# Patient Record
Sex: MALE | Race: WHITE | NOT HISPANIC OR LATINO | Employment: FULL TIME | RURAL
[De-identification: names, ages, dates, MRNs, and addresses within clinical notes are randomized per-mention and may not be internally consistent; named-entity substitution may affect disease eponyms.]

---

## 2024-09-11 ENCOUNTER — OFFICE VISIT (OUTPATIENT)
Dept: FAMILY MEDICINE | Facility: CLINIC | Age: 39
End: 2024-09-11
Payer: COMMERCIAL

## 2024-09-11 VITALS
DIASTOLIC BLOOD PRESSURE: 76 MMHG | SYSTOLIC BLOOD PRESSURE: 114 MMHG | BODY MASS INDEX: 29.77 KG/M2 | WEIGHT: 232 LBS | HEART RATE: 71 BPM | HEIGHT: 74 IN

## 2024-09-11 DIAGNOSIS — R19.7 DIARRHEA OF PRESUMED INFECTIOUS ORIGIN: ICD-10-CM

## 2024-09-11 DIAGNOSIS — I49.3 PVC'S (PREMATURE VENTRICULAR CONTRACTIONS): ICD-10-CM

## 2024-09-11 DIAGNOSIS — F17.200 TOBACCO DEPENDENCE: ICD-10-CM

## 2024-09-11 DIAGNOSIS — R00.2 PALPITATIONS: Primary | ICD-10-CM

## 2024-09-11 RX ORDER — DOXYCYCLINE HYCLATE 100 MG
100 TABLET ORAL DAILY
COMMUNITY
Start: 2024-08-12

## 2024-09-11 RX ORDER — FOLIC ACID 1 MG/1
1000 TABLET ORAL
COMMUNITY
Start: 2024-08-15

## 2024-09-11 NOTE — ASSESSMENT & PLAN NOTE
Patient has had experiences this which has led to his most recent evaluation with an EKG.  We will request records from this visit as well as labs.

## 2024-09-11 NOTE — PATIENT INSTRUCTIONS
José Mckinney,     If you are due for any health screening(s) below please notify me so we can arrange them to be ordered and scheduled to maintain your health. Most healthy patients complete it. Don't lose out on improving your health.     All of your core healthy metrics are met.

## 2024-09-11 NOTE — LETTER
AUTHORIZATION FOR RELEASE OF   CONFIDENTIAL INFORMATION    Dear Medical Records,    We are seeing Hank Sharif III, date of birth 1985, in the clinic at [facility]. Edmond Day IV, DO is the patient's PCP. Hank Sharif III has an outstanding lab/procedure at the time we reviewed his chart. In order to help keep his health information updated, he has authorized us to request the following medical record(s):        (  )  MAMMOGRAM                                      (  )  COLONOSCOPY      (  )  PAP SMEAR                                          (  )  OUTSIDE LAB RESULTS     (  )  DEXA SCAN                                          (  )  EYE EXAM            (  )  FOOT EXAM                                          (  )  ENTIRE RECORD     (  )  OUTSIDE IMMUNIZATIONS                 (X)  LAST OFFICE VISIT, LABS,EKG ETC...         Please fax records to Edmond Day IV, DO, [phone]     If you have any questions, please contact [place] at [phone].           Patient Name: Hank Sharif III  : 1985  Patient Phone #: [phone]

## 2024-09-11 NOTE — ASSESSMENT & PLAN NOTE
Patient is experiencing diarrhea for the last 3 weeks he has Juab stool type 6 stools that possibly began after shellfish ingestion.    Patient may be dehydrated with electrolyte abnormalities that could be causing these palpitations and PVCs.  Would evaluate with basic metabolic panel as well as ova and parasite and enteric pathogen panel.  We will request records and if these have not been done we will per care.

## 2024-09-11 NOTE — ASSESSMENT & PLAN NOTE
Patient states there was approximately 1 every minute to minute and have on an EKG had done outside facility.  We will request those records.

## 2024-09-11 NOTE — PROGRESS NOTES
"              Edmond Day IV, DO  [facility]  [address] [place] [address]  Phone: [phone]      Subjective     Name: Hank Sharif III   Sex: male  YOB: 1985 (39 y.o.)  MRN: [id number]  Visit Date: 09/11/2024   Chief Complaint: Palpitations        HISTORY OF PRESENT ILLNESS:    Chief Complaint   Patient presents with    Palpitations       HPI  See tests that keep you healthy and the problem oriented documentation below.    All of your core healthy metrics are met.      Portions of this note may have been created with voice recognition software. Occasional "wrong-word" or "sound-a-like" substitutions may have occurred due to the inherent limitations of voice recognition software. Please, read the note carefully and recognize, using context, where substitutions have occurred.     PAST MEDICAL HISTORY:  Significant Diagnoses - Patient  has no past medical history on file.  Medications - Patient has a current medication list which includes the following long-term medication(s): folic acid.   Allergies - Patient has No Known Allergies.  Surgeries - Patient  has no past surgical history on file.  Family History - Patient family history is not on file.      SOCIAL HISTORY:  Tobacco - Patient  reports that he has been smoking cigarettes. He started smoking about 22 years ago. He has a 45.4 pack-year smoking history. He has never used smokeless tobacco.   Alcohol - Patient  reports current alcohol use of about 3.0 standard drinks of alcohol per week.   Recreational Drugs - Patient  reports that he does not currently use drugs.       Review of Systems   All other systems reviewed and are negative.       No past medical history on file.     Review of patient's allergies indicates:  No Known Allergies     No past surgical history on file.     No family history on file.    Current Outpatient Medications   Medication Instructions    doxycycline (VIBRA-TABS) 100 mg, Oral, Daily " "   folic acid (FOLVITE) 1,000 mcg, Oral        Objective     /76   Pulse 71   Ht 6' 2" (1.88 m)   Wt 105.2 kg (232 lb)   BMI 29.79 kg/m²     Physical Exam  Constitutional:       General: He is not in acute distress.     Appearance: Normal appearance. He is not ill-appearing.   HENT:      Head: Normocephalic and atraumatic.      Right Ear: External ear normal.      Left Ear: External ear normal.      Nose: Nose normal.   Eyes:      Extraocular Movements: Extraocular movements intact.   Cardiovascular:      Rate and Rhythm: Normal rate.   Pulmonary:      Effort: Pulmonary effort is normal.   Abdominal:      Palpations: Abdomen is soft.   Musculoskeletal:      Cervical back: Normal range of motion. No tenderness.   Neurological:      Mental Status: He is alert.   Psychiatric:         Mood and Affect: Mood normal.         Behavior: Behavior normal.        All recently obtained labs have been reviewed and discussed in detail with the patient.   Assessment     1. Palpitations    2. PVC's (premature ventricular contractions)    3. Tobacco dependence    4. Diarrhea of presumed infectious origin         Plan        Problem List Items Addressed This Visit       Tobacco dependence (Chronic)    Relevant Orders    Basic Metabolic Panel    PVC's (premature ventricular contractions)     Patient states there was approximately 1 every minute to minute and have on an EKG had done outside facility.  We will request those records.         Relevant Orders    Basic Metabolic Panel    Palpitations - Primary (Chronic)     Patient has had experiences this which has led to his most recent evaluation with an EKG.  We will request records from this visit as well as labs.         Relevant Orders    Basic Metabolic Panel    Diarrhea of presumed infectious origin     Patient is experiencing diarrhea for the last 3 weeks he has Delaplane stool type 6 stools that possibly began after shellfish ingestion.    Patient may be dehydrated with " electrolyte abnormalities that could be causing these palpitations and PVCs.  Would evaluate with basic metabolic panel as well as ova and parasite and enteric pathogen panel.  We will request records and if these have not been done we will per care.         Relevant Orders    Enteric Pathogen Panel    Ova and Parasite Exam       No follow-ups on file.    Patient advised that is symptoms worsen, they should call or report directly to local emergency department.    Edmond Day, DO  [facility]

## 2024-10-22 ENCOUNTER — OFFICE VISIT (OUTPATIENT)
Dept: FAMILY MEDICINE | Facility: CLINIC | Age: 39
End: 2024-10-22
Payer: COMMERCIAL

## 2024-10-22 VITALS
HEART RATE: 81 BPM | HEIGHT: 74 IN | BODY MASS INDEX: 29 KG/M2 | SYSTOLIC BLOOD PRESSURE: 125 MMHG | DIASTOLIC BLOOD PRESSURE: 86 MMHG | WEIGHT: 226 LBS

## 2024-10-22 DIAGNOSIS — R07.9 CHEST PAIN, UNSPECIFIED TYPE: ICD-10-CM

## 2024-10-22 DIAGNOSIS — K02.9 DENTAL CARIES: ICD-10-CM

## 2024-10-22 DIAGNOSIS — Z82.49 FAMILY HISTORY OF EARLY CAD: Primary | ICD-10-CM

## 2024-10-22 PROCEDURE — 3008F BODY MASS INDEX DOCD: CPT | Mod: CPTII,,, | Performed by: FAMILY MEDICINE

## 2024-10-22 PROCEDURE — 1159F MED LIST DOCD IN RCRD: CPT | Mod: CPTII,,, | Performed by: FAMILY MEDICINE

## 2024-10-22 PROCEDURE — 3079F DIAST BP 80-89 MM HG: CPT | Mod: CPTII,,, | Performed by: FAMILY MEDICINE

## 2024-10-22 PROCEDURE — 99214 OFFICE O/P EST MOD 30 MIN: CPT | Mod: ,,, | Performed by: FAMILY MEDICINE

## 2024-10-22 PROCEDURE — 3074F SYST BP LT 130 MM HG: CPT | Mod: CPTII,,, | Performed by: FAMILY MEDICINE

## 2024-10-22 RX ORDER — PENICILLIN V POTASSIUM 500 MG/1
500 TABLET, FILM COATED ORAL EVERY 8 HOURS
Qty: 21 TABLET | Refills: 0 | Status: SHIPPED | OUTPATIENT
Start: 2024-10-22 | End: 2024-10-29

## 2024-10-23 NOTE — PROGRESS NOTES
"              Edmond Day IV, DO  [facility]  [address] [place] [address]  Phone: [phone]      Subjective     Name: Hank Sharif III   Sex: male  YOB: 1985 (39 y.o.)  MRN: [id number]  Visit Date: 10/23/2024   Chief Complaint: Chest Pain        HISTORY OF PRESENT ILLNESS:    Chief Complaint   Patient presents with    Chest Pain       HPI  See tests that keep you healthy and the problem oriented documentation below.    All of your core healthy metrics are met.      Portions of this note may have been created with voice recognition software. Occasional "wrong-word" or "sound-a-like" substitutions may have occurred due to the inherent limitations of voice recognition software. Please, read the note carefully and recognize, using context, where substitutions have occurred.     PAST MEDICAL HISTORY:  Significant Diagnoses - Patient  has no past medical history on file.  Medications - Patient is not on any long-term medications.   Allergies - Patient has No Known Allergies.  Surgeries - Patient  has no past surgical history on file.  Family History - Patient family history is not on file.      SOCIAL HISTORY:  Tobacco - Patient  reports that he has been smoking cigarettes. He started smoking about 22 years ago. He has a 45.6 pack-year smoking history. He has never used smokeless tobacco.   Alcohol - Patient  reports current alcohol use of about 3.0 standard drinks of alcohol per week.   Recreational Drugs - Patient  reports that he does not currently use drugs.       Review of Systems   Constitutional:  Negative for activity change and unexpected weight change.   HENT:  Negative for hearing loss, rhinorrhea and trouble swallowing.    Eyes:  Negative for discharge and visual disturbance.   Respiratory:  Positive for chest tightness. Negative for wheezing.    Cardiovascular:  Positive for chest pain. Negative for palpitations.   Gastrointestinal:  Negative for blood in " "stool, constipation, diarrhea and vomiting.   Endocrine: Negative for polydipsia and polyuria.   Genitourinary:  Negative for difficulty urinating, hematuria and urgency.   Musculoskeletal:  Negative for arthralgias, joint swelling and neck pain.   Neurological:  Negative for weakness and headaches.   Psychiatric/Behavioral:  Negative for confusion and dysphoric mood.    All other systems reviewed and are negative.       History reviewed. No pertinent past medical history.     Review of patient's allergies indicates:  No Known Allergies     History reviewed. No pertinent surgical history.     History reviewed. No pertinent family history.    Current Outpatient Medications   Medication Instructions    penicillin v potassium (VEETID) 500 mg, Oral, Every 8 hours        Objective     /86   Pulse 81   Ht 6' 2" (1.88 m)   Wt 102.5 kg (226 lb)   BMI 29.02 kg/m²     Physical Exam  Constitutional:       General: He is not in acute distress.     Appearance: Normal appearance. He is not ill-appearing.   HENT:      Head: Normocephalic and atraumatic.      Right Ear: External ear normal.      Left Ear: External ear normal.      Nose: Nose normal.   Eyes:      Extraocular Movements: Extraocular movements intact.   Cardiovascular:      Rate and Rhythm: Normal rate.   Pulmonary:      Effort: Pulmonary effort is normal.   Abdominal:      Palpations: Abdomen is soft.   Musculoskeletal:      Cervical back: Normal range of motion. No tenderness.   Neurological:      Mental Status: He is alert.   Psychiatric:         Mood and Affect: Mood normal.         Behavior: Behavior normal.        All recently obtained labs have been reviewed and discussed in detail with the patient.   Assessment     1. Family history of early CAD    2. Chest pain, unspecified type    3. Dental caries         Plan        Problem List Items Addressed This Visit    None  Visit Diagnoses       Family history of early CAD    -  Primary    Relevant Orders "    Lipid Panel    CBC Auto Differential    Comprehensive Metabolic Panel    Ambulatory referral/consult to Cardiology    Chest pain, unspecified type        Relevant Orders    Lipid Panel    CBC Auto Differential    Comprehensive Metabolic Panel    Ambulatory referral/consult to Cardiology    Dental caries        Relevant Medications    penicillin v potassium (VEETID) 500 MG tablet            No follow-ups on file.    Patient advised that is symptoms worsen, they should call or report directly to local emergency department.    Edmond Day, DO  [facility]

## 2024-11-08 ENCOUNTER — PATIENT MESSAGE (OUTPATIENT)
Dept: FAMILY MEDICINE | Facility: CLINIC | Age: 39
End: 2024-11-08
Payer: COMMERCIAL

## 2024-11-12 ENCOUNTER — OFFICE VISIT (OUTPATIENT)
Dept: FAMILY MEDICINE | Facility: CLINIC | Age: 39
End: 2024-11-12
Payer: COMMERCIAL

## 2024-11-12 VITALS
HEIGHT: 74 IN | SYSTOLIC BLOOD PRESSURE: 120 MMHG | WEIGHT: 226 LBS | BODY MASS INDEX: 29 KG/M2 | DIASTOLIC BLOOD PRESSURE: 85 MMHG | HEART RATE: 85 BPM

## 2024-11-12 DIAGNOSIS — K59.00 CONSTIPATION, UNSPECIFIED CONSTIPATION TYPE: Primary | ICD-10-CM

## 2024-11-12 PROCEDURE — 3079F DIAST BP 80-89 MM HG: CPT | Mod: CPTII,,, | Performed by: FAMILY MEDICINE

## 2024-11-12 PROCEDURE — 3074F SYST BP LT 130 MM HG: CPT | Mod: CPTII,,, | Performed by: FAMILY MEDICINE

## 2024-11-12 PROCEDURE — 1159F MED LIST DOCD IN RCRD: CPT | Mod: CPTII,,, | Performed by: FAMILY MEDICINE

## 2024-11-12 PROCEDURE — 99214 OFFICE O/P EST MOD 30 MIN: CPT | Mod: ,,, | Performed by: FAMILY MEDICINE

## 2024-11-12 PROCEDURE — 3008F BODY MASS INDEX DOCD: CPT | Mod: CPTII,,, | Performed by: FAMILY MEDICINE

## 2024-11-12 NOTE — PROGRESS NOTES
Edmond Day IV, DO  [facility] [place]  [address], [place] [address]  Phone: [phone]      Subjective     Name: Hank Sharif III   Sex: male  YOB: 1985 (39 y.o.)  MRN: [id number]  Visit Date: 11/12/2024   Chief Complaint: Constipation (constipation)        HISTORY OF PRESENT ILLNESS:    Chief Complaint   Patient presents with    Constipation     constipation       Abdominal Pain  This is a chronic problem. The current episode started more than 1 month ago. The onset quality is undetermined. The problem occurs constantly. The most recent episode lasted 6 weeks. The problem has been waxing and waning. The pain is located in the LLQ, LUQ, RLQ, RUQ, epigastric region, generalized abdominal region, periumbilical region, suprapubic region, left flank and right flank. The pain is at a severity of 5/10. The pain is mild. The quality of the pain is aching, cramping, a sensation of fullness and sharp. The abdominal pain radiates to the left shoulder, right shoulder, chest, left flank and right flank. Associated symptoms include anorexia, belching, constipation, diarrhea, flatus and myalgias. Pertinent negatives include no arthralgias, dysuria, fever, frequency, headaches, hematochezia, hematuria, melena, nausea, vomiting or weight loss. The pain is aggravated by certain positions, coughing and movement. The pain is relieved by Being still, belching, bowel movements, certain positions, movement, passing flatus, recumbency, sitting up and standing. He has tried acetaminophen and antacids for the symptoms. The treatment provided mild relief. His past medical history is significant for abdominal surgery. There is no history of colon cancer, Crohn's disease, gallstones, GERD, irritable bowel syndrome, pancreatitis, PUD or ulcerative colitis. Patient's medical history does not include kidney stones and UTI.   See tests that keep you healthy and the problem oriented  "documentation below.    All of your core healthy metrics are met.      Portions of this note may have been created with voice recognition software. Occasional "wrong-word" or "sound-a-like" substitutions may have occurred due to the inherent limitations of voice recognition software. Please, read the note carefully and recognize, using context, where substitutions have occurred.     PAST MEDICAL HISTORY:  Significant Diagnoses - Patient  has no past medical history on file.  Medications - Patient is not on any long-term medications.   Allergies - Patient has No Known Allergies.  Surgeries - Patient  has no past surgical history on file.  Family History - Patient family history is not on file.      SOCIAL HISTORY:  Tobacco - Patient  reports that he has been smoking cigarettes. He started smoking about 22 years ago. He has a 45.7 pack-year smoking history. He has never used smokeless tobacco.   Alcohol - Patient  reports current alcohol use of about 3.0 standard drinks of alcohol per week.   Recreational Drugs - Patient  reports that he does not currently use drugs.       Review of Systems   Constitutional:  Negative for fever and weight loss.   Gastrointestinal:  Positive for abdominal pain, anorexia, constipation, diarrhea and flatus. Negative for hematochezia, melena, nausea and vomiting.   Genitourinary:  Negative for dysuria, frequency and hematuria.   Musculoskeletal:  Positive for myalgias. Negative for arthralgias.   Neurological:  Negative for headaches.   All other systems reviewed and are negative.       No past medical history on file.     Review of patient's allergies indicates:  No Known Allergies     No past surgical history on file.     No family history on file.    No current outpatient medications     Objective     /85   Pulse 85   Ht 6' 2" (1.88 m)   Wt 102.5 kg (226 lb)   BMI 29.02 kg/m²     Physical Exam  Constitutional:       General: He is not in acute distress.     Appearance: Normal " appearance. He is not ill-appearing.   HENT:      Head: Normocephalic and atraumatic.      Right Ear: External ear normal.      Left Ear: External ear normal.      Nose: Nose normal.   Eyes:      Extraocular Movements: Extraocular movements intact.   Cardiovascular:      Rate and Rhythm: Normal rate.   Pulmonary:      Effort: Pulmonary effort is normal.   Abdominal:      Palpations: Abdomen is soft.   Musculoskeletal:      Cervical back: Normal range of motion. No tenderness.   Neurological:      Mental Status: He is alert.   Psychiatric:         Mood and Affect: Mood normal.         Behavior: Behavior normal.        All recently obtained labs have been reviewed and discussed in detail with the patient.   Assessment     1. Constipation, unspecified constipation type         Plan        Problem List Items Addressed This Visit       Constipation - Primary     Chronic problem for patient with exacerbation of symptoms.  He did use Mag citrate over the weekend to produce bowel movements.  Currently still feels bloated is belching often.  Is not passing gas.  I am going to recommend patient start Linzess 145 mcg p.o. q.d. for 4 days with further evaluation afterwards.            No follow-ups on file.    Patient advised that is symptoms worsen, they should call or report directly to local emergency department.    Edmond Day, DO  [facility]

## 2024-11-12 NOTE — ASSESSMENT & PLAN NOTE
Chronic problem for patient with exacerbation of symptoms.  He did use Mag citrate over the weekend to produce bowel movements.  Currently still feels bloated is belching often.  Is not passing gas.  I am going to recommend patient start Linzess 145 mcg p.o. q.d. for 4 days with further evaluation afterwards.

## 2024-11-15 ENCOUNTER — OFFICE VISIT (OUTPATIENT)
Dept: CARDIOLOGY | Facility: CLINIC | Age: 39
End: 2024-11-15
Payer: COMMERCIAL

## 2024-11-15 VITALS
WEIGHT: 220 LBS | BODY MASS INDEX: 28.23 KG/M2 | SYSTOLIC BLOOD PRESSURE: 130 MMHG | HEIGHT: 74 IN | HEART RATE: 86 BPM | OXYGEN SATURATION: 96 % | DIASTOLIC BLOOD PRESSURE: 90 MMHG

## 2024-11-15 DIAGNOSIS — R07.9 CHEST PAIN, UNSPECIFIED TYPE: Primary | ICD-10-CM

## 2024-11-15 DIAGNOSIS — Z82.49 FAMILY HISTORY OF EARLY CAD: ICD-10-CM

## 2024-11-15 PROCEDURE — 3080F DIAST BP >= 90 MM HG: CPT | Mod: CPTII,,, | Performed by: STUDENT IN AN ORGANIZED HEALTH CARE EDUCATION/TRAINING PROGRAM

## 2024-11-15 PROCEDURE — 99214 OFFICE O/P EST MOD 30 MIN: CPT | Mod: PBBFAC | Performed by: STUDENT IN AN ORGANIZED HEALTH CARE EDUCATION/TRAINING PROGRAM

## 2024-11-15 PROCEDURE — 3075F SYST BP GE 130 - 139MM HG: CPT | Mod: CPTII,,, | Performed by: STUDENT IN AN ORGANIZED HEALTH CARE EDUCATION/TRAINING PROGRAM

## 2024-11-15 PROCEDURE — 1159F MED LIST DOCD IN RCRD: CPT | Mod: CPTII,,, | Performed by: STUDENT IN AN ORGANIZED HEALTH CARE EDUCATION/TRAINING PROGRAM

## 2024-11-15 PROCEDURE — 99999 PR PBB SHADOW E&M-EST. PATIENT-LVL IV: CPT | Mod: PBBFAC,,, | Performed by: STUDENT IN AN ORGANIZED HEALTH CARE EDUCATION/TRAINING PROGRAM

## 2024-11-15 PROCEDURE — 3008F BODY MASS INDEX DOCD: CPT | Mod: CPTII,,, | Performed by: STUDENT IN AN ORGANIZED HEALTH CARE EDUCATION/TRAINING PROGRAM

## 2024-11-15 PROCEDURE — 99204 OFFICE O/P NEW MOD 45 MIN: CPT | Mod: S$PBB,,, | Performed by: STUDENT IN AN ORGANIZED HEALTH CARE EDUCATION/TRAINING PROGRAM

## 2024-11-15 NOTE — PROGRESS NOTES
"PCP: Edmond Day IV, DO    Referring Provider: Edmond Day IV, DO  [address] [facility] [place] [address]    Reason for referral:  Chest pain, family history of premature CAD    Subjective:   Hank Sharif III is a 39 y.o. male  current smoker who presents for a new patient visit.     Endorses intermittent dull chest pains for the past 6 weeks.  Patient attributes it to bloating and indigestion.  Pain is not related to exertion. Notes shortness of breath both with and without exertion.    Endorses chest pain and shortness of breath.    Fhx:  Family history of premature CAD.  Father had 1st MI in his 50s  Shx:  Current smoker stop 45.7 pack-year smoking history.    EKG 11/15/2024: Normal sinus rhythm  ECHO No results found for this or any previous visit.     CATH: No results found for this or any previous visit.     No results found for: "NA", "K", "CL", "CO2", "BUN", "CREATININE", "CALCIUM", "ANIONGAP", "ESTGFRAFRICA", "EGFRNONAA"    No results found for: "CHOL"  No results found for: "HDL"  No results found for: "LDLCALC"  No results found for: "TRIG"  No results found for: "CHOLHDL"    No results found for: "WBC", "HGB", "HCT", "MCV", "PLT"      No current outpatient medications on file.    Review of Systems   Constitutional:  Negative for chills, diaphoresis, fever and malaise/fatigue.   Respiratory:  Positive for shortness of breath. Negative for cough.    Cardiovascular:  Positive for chest pain. Negative for palpitations, orthopnea, claudication, leg swelling and PND.   Gastrointestinal:  Negative for abdominal pain, heartburn, nausea and vomiting.   Neurological:  Negative for dizziness.       Objective:   BP (!) 130/90 (BP Location: Left arm, Patient Position: Sitting)   Pulse 86   Ht 6' 2" (1.88 m)   Wt 99.8 kg (220 lb)   SpO2 96%   BMI 28.25 kg/m²     Physical Exam  Constitutional:       General: He is not in acute distress.     Appearance: Normal appearance.   Cardiovascular:      Rate and " Rhythm: Normal rate and regular rhythm.      Pulses: Normal pulses.      Heart sounds: Normal heart sounds. No murmur heard.     No friction rub. No gallop.   Pulmonary:      Effort: Pulmonary effort is normal.      Breath sounds: Normal breath sounds. No wheezing or rales.   Musculoskeletal:      Right lower leg: No edema.      Left lower leg: No edema.   Skin:     General: Skin is warm and dry.   Neurological:      Mental Status: He is alert.           Assessment:     1. Chest pain, unspecified type  EKG 12-lead    Ambulatory referral/consult to Cardiology    EKG 12-lead    Exercise Stress - EKG    Echo      2. Family history of early CAD  Ambulatory referral/consult to Cardiology            Plan:   No problem-specific Assessment & Plan notes found for this encounter.    Chest pain  Atypical; not associated with exertion.   Risk factors: Smoking, FH  Schedule exercise treadmill test, no imaging    Dyspnea   Appears euvolemic on exam  Schedule echocardiogram    Follow-up in 3 months

## 2024-12-05 ENCOUNTER — HOSPITAL ENCOUNTER (OUTPATIENT)
Dept: CARDIOLOGY | Facility: HOSPITAL | Age: 39
Discharge: HOME | End: 2024-12-05
Attending: STUDENT IN AN ORGANIZED HEALTH CARE EDUCATION/TRAINING PROGRAM
Payer: COMMERCIAL

## 2024-12-05 DIAGNOSIS — R07.9 CHEST PAIN, UNSPECIFIED TYPE: ICD-10-CM

## 2024-12-05 LAB
AORTIC ROOT ANNULUS: 3.28 CM
AORTIC VALVE CUSP SEPERATION: 1.99 CM
AV INDEX (PROSTH): 0.83
AV MEAN GRADIENT: 4.4 MMHG
AV PEAK GRADIENT: 7.8 MMHG
AV VALVE AREA BY VELOCITY RATIO: 3.3 CM²
AV VALVE AREA: 3.2 CM²
AV VELOCITY RATIO: 0.86
CV ECHO LV RWT: 0.3 CM
CV STRESS BASE HR: 84 BPM
DIASTOLIC BLOOD PRESSURE: 86 MMHG
DOP CALC AO PEAK VEL: 1.4 M/S
DOP CALC AO VTI: 32 CM
DOP CALC LVOT AREA: 3.8 CM2
DOP CALC LVOT DIAMETER: 2.2 CM
DOP CALC LVOT PEAK VEL: 1.2 M/S
DOP CALC LVOT STROKE VOLUME: 101.1 CM3
DOP CALCLVOT PEAK VEL VTI: 26.6 CM
E WAVE DECELERATION TIME: 190.63 MSEC
E/A RATIO: 1.3
E/E' RATIO: 7.04 M/S
ECHO LV POSTERIOR WALL: 0.8 CM (ref 0.6–1.1)
FRACTIONAL SHORTENING: 25.9 % (ref 28–44)
INTERVENTRICULAR SEPTUM: 0.6 CM (ref 0.6–1.1)
IVC DIAMETER: 1.11 CM
LEFT ATRIUM AREA SYSTOLIC (APICAL 2 CHAMBER): 16.27 CM2
LEFT ATRIUM AREA SYSTOLIC (APICAL 4 CHAMBER): 18.12 CM2
LEFT ATRIUM VOLUME MOD: 47.77 ML
LEFT INTERNAL DIMENSION IN SYSTOLE: 4 CM (ref 2.1–4)
LEFT VENTRICLE DIASTOLIC VOLUME: 143.26 ML
LEFT VENTRICLE END SYSTOLIC VOLUME APICAL 2 CHAMBER: 39.06 ML
LEFT VENTRICLE END SYSTOLIC VOLUME APICAL 4 CHAMBER: 52.78 ML
LEFT VENTRICLE SYSTOLIC VOLUME: 68.08 ML
LEFT VENTRICULAR INTERNAL DIMENSION IN DIASTOLE: 5.4 CM (ref 3.5–6)
LEFT VENTRICULAR MASS: 131.2 G
LV LATERAL E/E' RATIO: 5.59 M/S
LV SEPTAL E/E' RATIO: 9.5 M/S
LVED V (TEICH): 143.26 ML
LVES V (TEICH): 68.08 ML
LVOT MG: 3.03 MMHG
LVOT MV: 0.82 CM/S
MV PEAK A VEL: 0.73 M/S
MV PEAK E VEL: 0.95 M/S
OHS CV CPX 1 MINUTE RECOVERY HEART RATE: 104 BPM
OHS CV CPX 85 PERCENT MAX PREDICTED HEART RATE MALE: 152
OHS CV CPX ESTIMATED METS: 10
OHS CV CPX MAX PREDICTED HEART RATE: 179
OHS CV CPX PATIENT IS FEMALE: 0
OHS CV CPX PATIENT IS MALE: 1
OHS CV CPX PEAK DIASTOLIC BLOOD PRESSURE: 86 MMHG
OHS CV CPX PEAK HEAR RATE: 160 BPM
OHS CV CPX PEAK RATE PRESSURE PRODUCT: NORMAL
OHS CV CPX PEAK SYSTOLIC BLOOD PRESSURE: 169 MMHG
OHS CV CPX PERCENT MAX PREDICTED HEART RATE ACHIEVED: 89
OHS CV CPX RATE PRESSURE PRODUCT PRESENTING: NORMAL
OHS CV RV/LV RATIO: 0.31 CM
PISA TR MAX VEL: 1.12 M/S
PV PEAK GRADIENT: 4 MMHG
PV PEAK VELOCITY: 0.96 M/S
RA PRESSURE ESTIMATED: 3 MMHG
RA VOL SYS: 42.12 ML
RIGHT ATRIAL AREA: 16.3 CM2
RIGHT ATRIUM VOLUME AREA LENGTH APICAL 4 CHAMBER: 39.78 ML
RIGHT VENTRICLE DIASTOLIC BASEL DIMENSION: 1.7 CM
RIGHT VENTRICLE DIASTOLIC LENGTH: 8.3 CM
RIGHT VENTRICLE DIASTOLIC MID DIMENSION: 1.6 CM
RIGHT VENTRICULAR LENGTH IN DIASTOLE (APICAL 4-CHAMBER VIEW): 8.28 CM
RV MID DIAMA: 1.61 CM
RV TB RVSP: 4 MMHG
STRESS ECHO POST EXERCISE DUR MIN: 7 MINUTES
SYSTOLIC BLOOD PRESSURE: 144 MMHG
TDI LATERAL: 0.17 M/S
TDI SEPTAL: 0.1 M/S
TDI: 0.14 M/S
TR MAX PG: 5 MMHG
TRICUSPID ANNULAR PLANE SYSTOLIC EXCURSION: 2.08 CM
TV REST PULMONARY ARTERY PRESSURE: 8 MMHG

## 2024-12-05 PROCEDURE — 93017 CV STRESS TEST TRACING ONLY: CPT

## 2024-12-05 PROCEDURE — 93306 TTE W/DOPPLER COMPLETE: CPT

## 2025-02-05 ENCOUNTER — OFFICE VISIT (OUTPATIENT)
Dept: FAMILY MEDICINE | Facility: CLINIC | Age: 40
End: 2025-02-05
Payer: COMMERCIAL

## 2025-02-05 VITALS
SYSTOLIC BLOOD PRESSURE: 128 MMHG | BODY MASS INDEX: 28.23 KG/M2 | HEIGHT: 74 IN | DIASTOLIC BLOOD PRESSURE: 81 MMHG | HEART RATE: 90 BPM | WEIGHT: 220 LBS

## 2025-02-05 DIAGNOSIS — R07.9 CHEST PAIN, UNSPECIFIED TYPE: ICD-10-CM

## 2025-02-05 DIAGNOSIS — R19.7 DIARRHEA OF PRESUMED INFECTIOUS ORIGIN: Primary | ICD-10-CM

## 2025-02-05 DIAGNOSIS — Z82.49 FAMILY HISTORY OF EARLY CAD: ICD-10-CM

## 2025-02-05 PROCEDURE — 87177 OVA AND PARASITES SMEARS: CPT | Mod: ,,, | Performed by: CLINICAL MEDICAL LABORATORY

## 2025-02-05 PROCEDURE — 99213 OFFICE O/P EST LOW 20 MIN: CPT | Mod: ,,, | Performed by: FAMILY MEDICINE

## 2025-02-05 PROCEDURE — 1159F MED LIST DOCD IN RCRD: CPT | Mod: CPTII,,, | Performed by: FAMILY MEDICINE

## 2025-02-05 PROCEDURE — 85025 COMPLETE CBC W/AUTO DIFF WBC: CPT | Mod: ,,, | Performed by: CLINICAL MEDICAL LABORATORY

## 2025-02-05 PROCEDURE — 87506 IADNA-DNA/RNA PROBE TQ 6-11: CPT | Mod: ,,, | Performed by: CLINICAL MEDICAL LABORATORY

## 2025-02-05 PROCEDURE — 87209 SMEAR COMPLEX STAIN: CPT | Mod: ,,, | Performed by: CLINICAL MEDICAL LABORATORY

## 2025-02-05 PROCEDURE — 3079F DIAST BP 80-89 MM HG: CPT | Mod: CPTII,,, | Performed by: FAMILY MEDICINE

## 2025-02-05 PROCEDURE — 80053 COMPREHEN METABOLIC PANEL: CPT | Mod: ,,, | Performed by: CLINICAL MEDICAL LABORATORY

## 2025-02-05 PROCEDURE — 87493 C DIFF AMPLIFIED PROBE: CPT | Mod: 59,,, | Performed by: CLINICAL MEDICAL LABORATORY

## 2025-02-05 PROCEDURE — 80061 LIPID PANEL: CPT | Mod: ,,, | Performed by: CLINICAL MEDICAL LABORATORY

## 2025-02-05 PROCEDURE — 3074F SYST BP LT 130 MM HG: CPT | Mod: CPTII,,, | Performed by: FAMILY MEDICINE

## 2025-02-05 PROCEDURE — 3008F BODY MASS INDEX DOCD: CPT | Mod: CPTII,,, | Performed by: FAMILY MEDICINE

## 2025-02-05 RX ORDER — METRONIDAZOLE 500 MG/1
500 TABLET ORAL EVERY 12 HOURS
Qty: 20 TABLET | Refills: 0 | Status: SHIPPED | OUTPATIENT
Start: 2025-02-05 | End: 2025-02-10

## 2025-02-06 LAB
ALBUMIN SERPL BCP-MCNC: 4.1 G/DL (ref 3.5–5)
ALBUMIN/GLOB SERPL: 1.4 {RATIO}
ALP SERPL-CCNC: 63 U/L (ref 40–150)
ALT SERPL W P-5'-P-CCNC: 14 U/L
ANION GAP SERPL CALCULATED.3IONS-SCNC: 16 MMOL/L (ref 7–16)
AST SERPL W P-5'-P-CCNC: 19 U/L (ref 5–34)
BASOPHILS # BLD AUTO: 0.14 K/UL (ref 0–0.2)
BASOPHILS NFR BLD AUTO: 1.5 % (ref 0–1)
BILIRUB SERPL-MCNC: 0.4 MG/DL
BUN SERPL-MCNC: 17 MG/DL (ref 9–21)
BUN/CREAT SERPL: 14 (ref 6–20)
C COLI+JEJ+UPSA DNA STL QL NAA+NON-PROBE: NEGATIVE
CALCIUM SERPL-MCNC: 9.8 MG/DL (ref 8.4–10.2)
CHLORIDE SERPL-SCNC: 107 MMOL/L (ref 98–107)
CHOLEST SERPL-MCNC: 188 MG/DL
CHOLEST/HDLC SERPL: 4.9 {RATIO}
CO2 SERPL-SCNC: 22 MMOL/L (ref 22–29)
CREAT SERPL-MCNC: 1.19 MG/DL (ref 0.72–1.25)
DIFFERENTIAL METHOD BLD: ABNORMAL
E COLI SXT1 STL QL IA: NEGATIVE
E COLI SXT2 STL QL IA: NEGATIVE
EGFR (NO RACE VARIABLE) (RUSH/TITUS): 79 ML/MIN/1.73M2
EOSINOPHIL # BLD AUTO: 0.25 K/UL (ref 0–0.5)
EOSINOPHIL NFR BLD AUTO: 2.7 % (ref 1–4)
ERYTHROCYTE [DISTWIDTH] IN BLOOD BY AUTOMATED COUNT: 12.4 % (ref 11.5–14.5)
GLOBULIN SER-MCNC: 2.9 G/DL (ref 2–4)
GLUCOSE SERPL-MCNC: 108 MG/DL (ref 74–100)
HCT VFR BLD AUTO: 47.9 % (ref 40–54)
HDLC SERPL-MCNC: 38 MG/DL (ref 35–60)
HGB BLD-MCNC: 15.7 G/DL (ref 13.5–18)
IMM GRANULOCYTES # BLD AUTO: 0.14 K/UL (ref 0–0.04)
IMM GRANULOCYTES NFR BLD: 1.5 % (ref 0–0.4)
LDLC SERPL CALC-MCNC: 125 MG/DL
LDLC/HDLC SERPL: 3.3 {RATIO}
LYMPHOCYTES # BLD AUTO: 2.7 K/UL (ref 1–4.8)
LYMPHOCYTES NFR BLD AUTO: 29.4 % (ref 27–41)
MCH RBC QN AUTO: 31.7 PG (ref 27–31)
MCHC RBC AUTO-ENTMCNC: 32.8 G/DL (ref 32–36)
MCV RBC AUTO: 96.8 FL (ref 80–96)
MONOCYTES # BLD AUTO: 0.83 K/UL (ref 0–0.8)
MONOCYTES NFR BLD AUTO: 9.1 % (ref 2–6)
MPC BLD CALC-MCNC: 12.3 FL (ref 9.4–12.4)
NEUTROPHILS # BLD AUTO: 5.11 K/UL (ref 1.8–7.7)
NEUTROPHILS NFR BLD AUTO: 55.8 % (ref 53–65)
NONHDLC SERPL-MCNC: 150 MG/DL
NOROVIRUS GI+II RNA STL QL NAA+NON-PROBE: NEGATIVE
NRBC # BLD AUTO: 0 X10E3/UL
NRBC, AUTO (.00): 0 %
PLATELET # BLD AUTO: 241 K/UL (ref 150–400)
POTASSIUM SERPL-SCNC: 5 MMOL/L (ref 3.5–5.1)
PROT SERPL-MCNC: 7 G/DL (ref 6.4–8.3)
RBC # BLD AUTO: 4.95 M/UL (ref 4.6–6.2)
RVA RNA STL QL NAA+NON-PROBE: NEGATIVE
S ENT+BONG DNA STL QL NAA+NON-PROBE: NEGATIVE
SHIGELLA SPECIES NAT: NEGATIVE
SODIUM SERPL-SCNC: 140 MMOL/L (ref 136–145)
TRIGL SERPL-MCNC: 123 MG/DL (ref 34–140)
V CHOL+PARA+VUL DNA STL QL NAA+NON-PROBE: NEGATIVE
VLDLC SERPL-MCNC: 25 MG/DL
WBC # BLD AUTO: 9.17 K/UL (ref 4.5–11)
Y ENTEROCOL DNA STL QL NAA+NON-PROBE: NEGATIVE

## 2025-02-06 NOTE — PROGRESS NOTES
"              Edmond Day IV, DO  [facility]  [address] [place] [address]  Phone: [phone]      Subjective     Name: Hank Sharif III   Sex: male  YOB: 1985 (39 y.o.)  MRN: [id number]  Visit Date: 2/5/25   Chief Complaint: Diarrhea (Diarrhea daily for 2 weeks)        HISTORY OF PRESENT ILLNESS:    Chief Complaint   Patient presents with    Diarrhea     Diarrhea daily for 2 weeks       HPI        Review of Systems   All other systems reviewed and are negative.        SOCIAL HISTORY:  Tobacco - Patient  reports that he has been smoking cigarettes. He started smoking about 23 years ago. He has a 46.2 pack-year smoking history. He has never used smokeless tobacco.   Alcohol - Patient  reports current alcohol use of about 3.0 standard drinks of alcohol per week.   Recreational Drugs - Patient  reports that he does not currently use drugs.         See tests that keep you healthy and the problem oriented documentation below.    Tests to Keep You Healthy    Tobacco Cessation: NO      Portions of this note may have been created with voice recognition software. Occasional "wrong-word" or "sound-a-like" substitutions may have occurred due to the inherent limitations of voice recognition software. Please, read the note carefully and recognize, using context, where substitutions have occurred.          No past medical history on file.     Review of patient's allergies indicates:  No Known Allergies     Past Surgical History:   Procedure Laterality Date    KNEE ARTHROPLASTY      x3        Family History   Problem Relation Name Age of Onset    Diabetes Mother      Heart attack Father          x7       Current Outpatient Medications   Medication Instructions    metroNIDAZOLE (FLAGYL) 500 mg, Oral, Every 12 hours        Objective     /81   Pulse 90   Ht 6' 2" (1.88 m)   Wt 99.8 kg (220 lb)   BMI 28.25 kg/m²     Physical Exam  Constitutional:       General: He is not " in acute distress.     Appearance: Normal appearance. He is not ill-appearing.   HENT:      Head: Normocephalic and atraumatic.      Right Ear: External ear normal.      Left Ear: External ear normal.      Nose: Nose normal.   Eyes:      Extraocular Movements: Extraocular movements intact.   Cardiovascular:      Rate and Rhythm: Normal rate.   Pulmonary:      Effort: Pulmonary effort is normal.   Abdominal:      Palpations: Abdomen is soft.   Musculoskeletal:      Cervical back: Normal range of motion. No tenderness.   Neurological:      Mental Status: He is alert.   Psychiatric:         Mood and Affect: Mood normal.         Behavior: Behavior normal.        All recently obtained labs have been reviewed and discussed in detail with the patient.   Assessment     1. Diarrhea of presumed infectious origin    2. Family history of early CAD    3. Chest pain, unspecified type         Plan        Problem List Items Addressed This Visit       Diarrhea of presumed infectious origin - Primary (Chronic)    Relevant Medications    metroNIDAZOLE (FLAGYL) 500 MG tablet    Other Relevant Orders    C Diff Toxin by PCR     Other Visit Diagnoses       Family history of early CAD        Chest pain, unspecified type                No follow-ups on file.    Patient advised that is symptoms worsen, they should call or report directly to local emergency department.    Edmond Day,   [facility]

## 2025-02-07 LAB
C DIFF TOX A+B STL IA-ACNC: POSITIVE
O+P STL CONC: NORMAL
TRICHROME SMEAR: NORMAL

## 2025-02-10 ENCOUNTER — OFFICE VISIT (OUTPATIENT)
Dept: FAMILY MEDICINE | Facility: CLINIC | Age: 40
End: 2025-02-10
Payer: COMMERCIAL

## 2025-02-10 VITALS
HEART RATE: 69 BPM | BODY MASS INDEX: 28.23 KG/M2 | SYSTOLIC BLOOD PRESSURE: 109 MMHG | DIASTOLIC BLOOD PRESSURE: 73 MMHG | HEIGHT: 74 IN | WEIGHT: 220 LBS

## 2025-02-10 DIAGNOSIS — A49.8 CLOSTRIDIUM DIFFICILE INFECTION: Primary | ICD-10-CM

## 2025-02-10 PROCEDURE — 3078F DIAST BP <80 MM HG: CPT | Mod: CPTII,,, | Performed by: FAMILY MEDICINE

## 2025-02-10 PROCEDURE — 3008F BODY MASS INDEX DOCD: CPT | Mod: CPTII,,, | Performed by: FAMILY MEDICINE

## 2025-02-10 PROCEDURE — 3074F SYST BP LT 130 MM HG: CPT | Mod: CPTII,,, | Performed by: FAMILY MEDICINE

## 2025-02-10 PROCEDURE — 1159F MED LIST DOCD IN RCRD: CPT | Mod: CPTII,,, | Performed by: FAMILY MEDICINE

## 2025-02-10 PROCEDURE — 99213 OFFICE O/P EST LOW 20 MIN: CPT | Mod: ,,, | Performed by: FAMILY MEDICINE

## 2025-02-10 RX ORDER — VANCOMYCIN HYDROCHLORIDE 125 MG/1
125 CAPSULE ORAL EVERY 6 HOURS
Qty: 40 CAPSULE | Refills: 0 | Status: SHIPPED | OUTPATIENT
Start: 2025-02-10 | End: 2025-02-20

## 2025-02-10 NOTE — PROGRESS NOTES
"              Edmond Day IV, DO  [facility]  [address] [place] [address]  Phone: [phone]      Subjective     Name: Hank Sharif III   Sex: male  YOB: 1985 (39 y.o.)  MRN: [id number]  Visit Date: 2/10/25   Chief Complaint: Follow-up (F/u for positive lab result)        HISTORY OF PRESENT ILLNESS:    Chief Complaint   Patient presents with    Follow-up     F/u for positive lab result       HPI        Review of Systems   All other systems reviewed and are negative.        SOCIAL HISTORY:  Tobacco - Patient  reports that he has been smoking cigarettes. He started smoking about 23 years ago. He has a 46.2 pack-year smoking history. He has never used smokeless tobacco.   Alcohol - Patient  reports current alcohol use of about 3.0 standard drinks of alcohol per week.   Recreational Drugs - Patient  reports that he does not currently use drugs.         See tests that keep you healthy and the problem oriented documentation below.    Tests to Keep You Healthy    Tobacco Cessation: NO      Portions of this note may have been created with voice recognition software. Occasional "wrong-word" or "sound-a-like" substitutions may have occurred due to the inherent limitations of voice recognition software. Please, read the note carefully and recognize, using context, where substitutions have occurred.          No past medical history on file.     Review of patient's allergies indicates:  No Known Allergies     Past Surgical History:   Procedure Laterality Date    KNEE ARTHROPLASTY      x3        Family History   Problem Relation Name Age of Onset    Diabetes Mother      Heart attack Father          x7       Current Outpatient Medications   Medication Instructions    vancomycin (VANCOCIN) 125 mg, Oral, Every 6 hours        Objective     /73   Pulse 69   Ht 6' 2" (1.88 m)   Wt 99.8 kg (220 lb)   BMI 28.25 kg/m²     Physical Exam  Constitutional:       General: He is " not in acute distress.     Appearance: Normal appearance. He is not ill-appearing.   HENT:      Head: Normocephalic and atraumatic.      Right Ear: External ear normal.      Left Ear: External ear normal.      Nose: Nose normal.   Eyes:      Extraocular Movements: Extraocular movements intact.   Cardiovascular:      Rate and Rhythm: Normal rate.   Pulmonary:      Effort: Pulmonary effort is normal.   Abdominal:      Palpations: Abdomen is soft.   Musculoskeletal:      Cervical back: Normal range of motion. No tenderness.   Neurological:      Mental Status: He is alert.   Psychiatric:         Mood and Affect: Mood normal.         Behavior: Behavior normal.        All recently obtained labs have been reviewed and discussed in detail with the patient.   Assessment     1. Clostridium difficile infection         Plan        Problem List Items Addressed This Visit       Clostridium difficile infection - Primary     Initial infection recommend vancomycin 125 mg p.o. q.6 H for 10 days.         Relevant Medications    vancomycin (VANCOCIN) 125 MG capsule       No follow-ups on file.    Patient advised that is symptoms worsen, they should call or report directly to local emergency department.    Edmond Day DO  [facility]